# Patient Record
Sex: MALE | Race: WHITE | URBAN - METROPOLITAN AREA
[De-identification: names, ages, dates, MRNs, and addresses within clinical notes are randomized per-mention and may not be internally consistent; named-entity substitution may affect disease eponyms.]

---

## 2017-02-21 ENCOUNTER — HISTORICAL (OUTPATIENT)
Dept: ADMINISTRATIVE | Facility: HOSPITAL | Age: 82
End: 2017-02-21

## 2017-05-05 ENCOUNTER — HISTORICAL (OUTPATIENT)
Dept: ADMINISTRATIVE | Facility: HOSPITAL | Age: 82
End: 2017-05-05

## 2017-05-05 LAB
ABS NEUT (OLG): 7.12 X10(3)/MCL (ref 2.1–9.2)
ALBUMIN SERPL-MCNC: 3.3 GM/DL (ref 3.4–5)
ALBUMIN/GLOB SERPL: 1.1 RATIO (ref 1.1–2)
ALP SERPL-CCNC: 84 UNIT/L (ref 50–136)
ALT SERPL-CCNC: 14 UNIT/L (ref 12–78)
AST SERPL-CCNC: 17 UNIT/L (ref 15–37)
BASOPHILS # BLD AUTO: 0.1 X10(3)/MCL (ref 0–0.2)
BASOPHILS NFR BLD AUTO: 1 %
BILIRUB SERPL-MCNC: 0.6 MG/DL (ref 0.2–1)
BILIRUBIN DIRECT+TOT PNL SERPL-MCNC: 0.1 MG/DL (ref 0–0.5)
BILIRUBIN DIRECT+TOT PNL SERPL-MCNC: 0.5 MG/DL (ref 0–0.8)
BUN SERPL-MCNC: 25 MG/DL (ref 7–18)
CALCIUM SERPL-MCNC: 8.9 MG/DL (ref 8.5–10.1)
CHLORIDE SERPL-SCNC: 104 MMOL/L (ref 98–107)
CO2 SERPL-SCNC: 30 MMOL/L (ref 21–32)
CREAT SERPL-MCNC: 1.22 MG/DL (ref 0.7–1.3)
EOSINOPHIL # BLD AUTO: 0.1 X10(3)/MCL (ref 0–0.9)
EOSINOPHIL NFR BLD AUTO: 1 %
ERYTHROCYTE [DISTWIDTH] IN BLOOD BY AUTOMATED COUNT: 14.1 % (ref 11.5–17)
GLOBULIN SER-MCNC: 3.1 GM/DL (ref 2.4–3.5)
GLUCOSE SERPL-MCNC: 123 MG/DL (ref 74–106)
HCT VFR BLD AUTO: 38.3 % (ref 42–52)
HGB BLD-MCNC: 11.5 GM/DL (ref 14–18)
LYMPHOCYTES # BLD AUTO: 1.1 X10(3)/MCL (ref 0.6–4.6)
LYMPHOCYTES NFR BLD AUTO: 12 %
MCH RBC QN AUTO: 28.5 PG (ref 27–31)
MCHC RBC AUTO-ENTMCNC: 30 GM/DL (ref 33–36)
MCV RBC AUTO: 94.8 FL (ref 80–94)
MONOCYTES # BLD AUTO: 0.5 X10(3)/MCL (ref 0.1–1.3)
MONOCYTES NFR BLD AUTO: 6 %
NEUTROPHILS # BLD AUTO: 7.12 X10(3)/MCL (ref 2.1–9.2)
NEUTROPHILS NFR BLD AUTO: 80 %
PLATELET # BLD AUTO: 221 X10(3)/MCL (ref 130–400)
PMV BLD AUTO: 11.7 FL (ref 9.4–12.4)
POTASSIUM SERPL-SCNC: 4 MMOL/L (ref 3.5–5.1)
PROT SERPL-MCNC: 6.4 GM/DL (ref 6.4–8.2)
RBC # BLD AUTO: 4.04 X10(6)/MCL (ref 4.7–6.1)
SODIUM SERPL-SCNC: 143 MMOL/L (ref 136–145)
WBC # SPEC AUTO: 8.9 X10(3)/MCL (ref 4.5–11.5)

## 2017-11-28 ENCOUNTER — HISTORICAL (OUTPATIENT)
Dept: ADMINISTRATIVE | Facility: HOSPITAL | Age: 82
End: 2017-11-28

## 2017-11-28 LAB
ABS NEUT (OLG): 5.49 X10(3)/MCL (ref 2.1–9.2)
ALBUMIN SERPL-MCNC: 3.4 GM/DL (ref 3.4–5)
ALBUMIN/GLOB SERPL: 1 {RATIO}
ALP SERPL-CCNC: 101 UNIT/L (ref 50–136)
ALT SERPL-CCNC: 16 UNIT/L (ref 12–78)
AST SERPL-CCNC: 16 UNIT/L (ref 15–37)
BASOPHILS # BLD AUTO: 0.1 X10(3)/MCL (ref 0–0.2)
BASOPHILS NFR BLD AUTO: 1 %
BILIRUB SERPL-MCNC: 0.9 MG/DL (ref 0.2–1)
BILIRUBIN DIRECT+TOT PNL SERPL-MCNC: 0.2 MG/DL (ref 0–0.2)
BILIRUBIN DIRECT+TOT PNL SERPL-MCNC: 0.7 MG/DL (ref 0–0.8)
BUN SERPL-MCNC: 24 MG/DL (ref 7–18)
CALCIUM SERPL-MCNC: 8.9 MG/DL (ref 8.5–10.1)
CHLORIDE SERPL-SCNC: 103 MMOL/L (ref 98–107)
CHOLEST SERPL-MCNC: 187 MG/DL (ref 0–200)
CHOLEST/HDLC SERPL: 3.8 {RATIO} (ref 0–5)
CO2 SERPL-SCNC: 32 MMOL/L (ref 21–32)
CREAT SERPL-MCNC: 1.17 MG/DL (ref 0.7–1.3)
EOSINOPHIL # BLD AUTO: 0.2 X10(3)/MCL (ref 0–0.9)
EOSINOPHIL NFR BLD AUTO: 2 %
ERYTHROCYTE [DISTWIDTH] IN BLOOD BY AUTOMATED COUNT: 13.9 % (ref 11.5–17)
GLOBULIN SER-MCNC: 3.3 GM/DL (ref 2.4–3.5)
GLUCOSE SERPL-MCNC: 82 MG/DL (ref 74–106)
HCT VFR BLD AUTO: 41.8 % (ref 42–52)
HDLC SERPL-MCNC: 49 MG/DL (ref 35–60)
HGB BLD-MCNC: 13.1 GM/DL (ref 14–18)
LDLC SERPL CALC-MCNC: 108 MG/DL (ref 0–129)
LYMPHOCYTES # BLD AUTO: 1.5 X10(3)/MCL (ref 0.6–4.6)
LYMPHOCYTES NFR BLD AUTO: 19 %
MCH RBC QN AUTO: 28.2 PG (ref 27–31)
MCHC RBC AUTO-ENTMCNC: 31.3 GM/DL (ref 33–36)
MCV RBC AUTO: 89.9 FL (ref 80–94)
MONOCYTES # BLD AUTO: 0.7 X10(3)/MCL (ref 0.1–1.3)
MONOCYTES NFR BLD AUTO: 9 %
NEUTROPHILS # BLD AUTO: 5.49 X10(3)/MCL (ref 2.1–9.2)
NEUTROPHILS NFR BLD AUTO: 69 %
PLATELET # BLD AUTO: 199 X10(3)/MCL (ref 130–400)
PMV BLD AUTO: 11.5 FL (ref 9.4–12.4)
POTASSIUM SERPL-SCNC: 4 MMOL/L (ref 3.5–5.1)
PROT SERPL-MCNC: 6.7 GM/DL (ref 6.4–8.2)
RBC # BLD AUTO: 4.65 X10(6)/MCL (ref 4.7–6.1)
SODIUM SERPL-SCNC: 141 MMOL/L (ref 136–145)
TRIGL SERPL-MCNC: 150 MG/DL (ref 30–150)
TSH SERPL-ACNC: 4.56 MIU/ML (ref 0.36–3.74)
VLDLC SERPL CALC-MCNC: 30 MG/DL
WBC # SPEC AUTO: 8 X10(3)/MCL (ref 4.5–11.5)

## 2018-01-03 LAB
INFLUENZA A ANTIGEN, POC: NEGATIVE
INFLUENZA B ANTIGEN, POC: NEGATIVE

## 2022-09-21 ENCOUNTER — HISTORICAL (OUTPATIENT)
Dept: ADMINISTRATIVE | Facility: HOSPITAL | Age: 87
End: 2022-09-21

## 2022-09-27 NOTE — PROGRESS NOTES
Patient:   Reggie Lerma            MRN: [id number]            FIN: [id number]               Age:   89 years     Sex:  Male     :  1920   Associated Diagnoses:   None   Author:   Piotr Ortega MD      Chief Complaint   1/3/2018 13:15 CST       c/o sore throat, dry cough, sob , pain in r. knee and hip, double vision        History of Present Illness   The patient is a 89-year-old man complaining of a 2 day history of a mild sore throat, headache, shortness of breath, mild dry cough, pain in the right knee and hip. He's had some double vision intermittently. Cold all the time. Doesn't know if he has had fever. No edema.      Health Status   Allergies:    Allergic Reactions (Selected)  Severity Not Documented  Aspirin- Nausea.  Statins- No reactions were documented.,    Allergies (2) Active Reaction  aspirin Nausea  statins None Documented     Current medications:  (Selected)   Prescriptions  Prescribed  Lasix 40 mg oral tablet: 40 mg = 1 tab(s), Oral, M,W,F, # 30 tab(s), 11 Refill(s), [id number]  albuterol 2.5 mg/3 mL (0.083%) inhalation solution: See Instructions, USE ONE VIAL IN NEBULIZER 4 TIMES DAILY AS NEEDED FOR WHEEZING, # 300 unknown unit, 11 Refill(s), eRx: [facility]  prednisONE 5 mg oral tablet: See Instructions, TAKE 1.5  TABLETS BY MOUTH ONCE DAILY, # 45 tab(s), 5 Refill(s), Pharmacy: [facility] #[id number]  Documented Medications  Documented  Plavix 75 mg oral tablet: 75 mg = 1 tab(s), Oral, Daily, # 90 tab(s), 0 Refill(s)  Prilosec 20 mg ([facility]): 40 mg, Oral, Daily, # 30, 0 Refill(s)  Symbicort: 1puff, INH, BID, 0 Refill(s)  Vitamin B-12: Oral, Once a day (at bedtime), 0 Refill(s)  Zantac 150 oral tablet: 150 mg = 1 tab(s), Oral, Once a day (at bedtime), # 30 tab(s), 0 Refill(s)  isosorbide MONOnitrate 30 mg oral tablet, Extended Release: 30 mg = 1 tab(s), Oral, qAM, # 30 tab(s), 0 Refill(s)  lisinopril 2.5 mg oral tablet: 2.5 mg = 1 tab(s),  Oral, Daily, 0 Refill(s),    Home Medications (10) Active  albuterol 2.5 mg/3 mL (0.083%) inhalation solution See Instructions  isosorbide MONOnitrate 30 mg oral tablet, Extended Release 30 mg = 1 tab(s), Oral, qAM  Lasix 40 mg oral tablet 40 mg = 1 tab(s), Oral, M,W,F  lisinopril 2.5 mg oral tablet 2.5 mg = 1 tab(s), Oral, Daily  Plavix 75 mg oral tablet 75 mg = 1 tab(s), Oral, Daily  prednisONE 5 mg oral tablet See Instructions  Prilosec 20 mg ([facility]) 40 mg, Oral, Daily  Symbicort 1puff, INH, BID  Vitamin B-12 , Oral, Once a day (at bedtime)  Zantac 150 oral tablet 150 mg = 1 tab(s), Oral, Once a day (at bedtime)     Problem list:    All Problems  Abdominal pain / SNOMED CT 24851078 / Confirmed  Abnormal weight loss / SNOMED CT 696687925 / Confirmed  Acute diarrhea / SNOMED CT [id number] / Confirmed  Asthma(  Confirmed  ) / SNOMED CT 48205568 / Confirmed  Cough / SNOMED CT 724242500 / Confirmed  CAD - Coronary artery disease / SNOMED CT 9873733154 / Confirmed  Cardiomyopathy / SNOMED CT [id number] / Confirmed  Degenerative joint disease / ICD-9-.90 / Confirmed  Hypertension, essential(  Confirmed  ) / SNOMED CT 12118628 / Confirmed  Ongoing use of possibly toxic medication / ICD-9-CM V58.69 / Confirmed  Resolved: Asthma / SNOMED CT [id number]  Resolved: CAD / SNOMED CT 5100997884  Resolved: CHF (congestive heart failure) / SNOMED CT [id number]  Resolved: Coronary artery disease / SNOMED CT [id number]  Resolved: GERD / SNOMED CT 8911491744  Resolved: HTN / SNOMED CT 00546538  Resolved: TIA / SNOMED CT 308355134  Canceled: Alteration in tissue perfusion / SNOMED CT 9563063227  Problem automatically updated based on documentation on Capillary Refills, Brachial Pulses, Radial Pulses, Femoral Pulses, Dorsalis Pulses, Ulnar pulses, Popliteal Pulses, Postibial Pulses,  Edemas, Affect/Behavior, Orientation Assessment, Arterial Line Site.  Canceled: At risk for aspiration / SNOMED CT 8192279004  Problem automatically updated based on documentation on Cough, Oxygen Therapy, Aspiration Risk, CN IX, X Swallowing Gag Reflex, GI Symptoms, Nutrition Risk Factors, and/or Enteral Tube Type.  Canceled: At risk for falls / SNOMED CT 285182967  Problem automatically updated based on documentation on History of Falls, History of Falls in last 3 months Pablo, Pablo Fall Risk Score and/or ADLs.  Canceled: At risk for impaired skin integrity / SNOMED CT 286990370  Problem automatically updated based on documentation on Skin Abnormality Type, Pressure Ulcer Present on Admission, Oxygen Therapy, Mask/Delivery Type, Pressure Point, and/or a Pirnce Score of less than 16.  Canceled: At risk for infection / SNOMED CT 749493127  Problem automatically updated based on documentation on WBC and/or Neutro Auto.  Canceled: At risk of healthcare associated infection / SNOMED CT 3971149685  Problem automatically updated based on Central IV Access Type, Arterial Line Site, PA Catheter Type, Pacemaker Type.  Canceled: At risk of pressure sore / SNOMED CT 173221623  Canceled: Difficulty using verbal communication / SNOMED CT 104812837  Problem automatically updated based on documentation on Artificial Airway Type, Characteristics of Communication and/or Lines or Tubes Present on Admission.  Canceled: Disorder of fluid balance / SNOMED CT 939843220  Problem automatically updated based on documentation on Edema, Anasarca, Edema, Generalized, Edema, Bilateral Periorbital, Edema, Face, Edema,, Bilateral Arm, Edema, Left Arm, Edema, Right Arm, Edema, Bilateral Hand, Edema, Left Hand, Edema, Right Hand, Edema, Labia, Edema, Scrotum, Edema, Penile, Edema, Bilateral Upper Leg, Edema, Bilateral Lower Leg, Edema, Bilateral Pretibial, Edema, Bilateral Ankle, Edema, Bilateral Pedal, GI Symptoms, Skin Turgor General, and/or  Gestational Age Method.  Canceled: Impaired skin integrity / SNOMED CT 20360039  Problem automatically updated based on documentation on Skin Abnormality Type and/or Pressure Ulcer Present on Admisison.  Canceled: Ineffective airway clearance / SNOMED CT 803195234  Problem automatically updated based on documentation of Shortness of Breath on Respiratory Symptoms; SpO2 less than 92%; Unable to clear secretions or Weak on Cough.  Canceled: Mobility / SNOMED CT 433011108      Histories   Past Medical History:    Resolved  Coronary artery disease ([id number]):  Resolved.  CHF (congestive heart failure) ([id number]):  Resolved.  TIA (562011133):  Resolved.  Asthma ([id number]):  Resolved.  GERD (4169507521):  Resolved.  HTN (57230163):  Resolved.  CAD (4181346465):  Resolved.   Family History:    History is unknown.   Procedure history:    Carotid Artery Endarterectomy (Left) on 3/21/2014 at 89 Years.  Comments:  3/21/2014 13:59 - Credeur MAEVE, Luther COFFMAN  auto-populated from documented surgical case  Hernia repair ([id number]).  CABG - Coronary artery bypass graft (430786871).  Cholecystectomy (88176756).  Appendectomy (933409300).  Back fusion (436949330).  Hand tendon operation (708643626).  Tonsillectomy (780499742).  carotid artery surgery.  hernia surgery.  back surgery.  hand surgery.  Open heart surgery (0271362).  cataract surgery.   Social History        Social & Psychosocial Habits    Alcohol  03/20/2014 Risk Assessment: Denies Alcohol Use    04/09/2015  Use: Never    Employment/School  04/09/2015  Status: Retired    Exercise  04/09/2015  Duration (average number of minutes): 0    Home/Environment  04/09/2015  Lives with: Children    Nutrition/Health  04/09/2015  Type of diet: Regular    Sexual  07/14/2016  Sexually active: No    Substance Abuse  04/09/2015  Use: Never    Tobacco  03/20/2014 Risk Assessment: Denies Tobacco  Use    11/30/2017  Use: Never smoker  .        Physical Examination   Vital Signs   1/3/2018 13:15 CST       Temperature Oral          36.4 DegC                             Temperature Oral (calculated)             97.52 DegF                             Peripheral Pulse Rate     77 bpm                             Respiratory Rate          18 br/min                             SpO2                      98 %                             Systolic Blood Pressure   136 mmHg                             Diastolic Blood Pressure  72 mmHg     Measurements from flowsheet : Measurements   1/3/2018 13:15 CST       Weight Dosing             72 kg                             Weight Measured           72 kg                             Weight Measured and Calculated in Lbs     158.73 lb                             Height/Length Dosing      187.96 cm                             Height/Length Measured    187.96 cm                             BSA Measured              1.94 m2                             Body Mass Index Measured  20.38 kg/m2     Vital signs are stable.    General appearance is unremarkable. Patient is in no distress    HEENT exam unremarkable except for some redness of the throat and a moderate amount of grayish mucus on the posterior pharynx.    Neck supple without thyromegaly or adenopathy. No jugular venous distention    Heart has a regular rate and rhythm without murmurs gallops or rubs    Lungs clear    Abdomen is nontender and without mass or hepatosplenomegaly.    Extremities without clubbing cyanosis or edema. No knee effusion      Impression and Plan   #1 respiratory tract infection. Most likely sinusitis/bronchitis. Rule out influenza    #2 multiple medical problems area    The plan will be to check a flu swab. If positive treat for influenza. If negative both treat him for sinobronchial syndrome with antibiotics. If all goes well follow-up as scheduled.